# Patient Record
Sex: MALE | Race: WHITE | NOT HISPANIC OR LATINO | Employment: FULL TIME | URBAN - NONMETROPOLITAN AREA
[De-identification: names, ages, dates, MRNs, and addresses within clinical notes are randomized per-mention and may not be internally consistent; named-entity substitution may affect disease eponyms.]

---

## 2019-10-10 ENCOUNTER — OFFICE VISIT (OUTPATIENT)
Dept: UROLOGY | Facility: CLINIC | Age: 24
End: 2019-10-10

## 2019-10-10 VITALS
OXYGEN SATURATION: 99 % | DIASTOLIC BLOOD PRESSURE: 90 MMHG | WEIGHT: 160 LBS | SYSTOLIC BLOOD PRESSURE: 140 MMHG | HEIGHT: 72 IN | RESPIRATION RATE: 18 BRPM | BODY MASS INDEX: 21.67 KG/M2 | HEART RATE: 82 BPM

## 2019-10-10 DIAGNOSIS — N46.9 INFERTILITY MALE: Primary | ICD-10-CM

## 2019-10-10 PROCEDURE — 99204 OFFICE O/P NEW MOD 45 MIN: CPT | Performed by: UROLOGY

## 2019-10-10 NOTE — PROGRESS NOTES
Chief Complaint  Infertility    HPI  Mr. Diogenes Sy is a 24 y.o. gentleman who presents to the clinic today due to infertility.  He and his wife have been attempting conception for 2 years.   He has impregnated another partner in the past. Has a 4 year old.    Sexual method  Neither use birth control and they do not use commercial lubricants.  [place] frequency: approximately 2-3 times per week.    Partner  His partner is 30 years old and she has not previously been pregnant.  She has no chronic medical conditions.  She does not have regular menses.  She has not undergone a fertility work up as far as he knows.  He says she is on several Rx, possibly clomiphene.    Fertility-related history  No other surgeries, h/o radiation or childhood illnesses.       Fertility-related ROS  He does not use marijuana.  He does not have daily fatigue or recently decreased sexual drive/libido.  He has not noticed flushing or hot flashes  He has not noticed breast swelling, tenderness or galactorrhea.  He has not had a history of STDs.  He has not have impairments of visual field, smell or chronic bronchitis.  He has not had have a history of recent acute illnesses.  He does not use a laptop computer placed in his lap regularly    Past Medical History  History reviewed. No pertinent past medical history.    Past Surgical History  History reviewed. No pertinent surgical history.    Medications  No current outpatient medications on file.    Allergies  No Known Allergies    Social History  Social History     Socioeconomic History   • Marital status:      Spouse name: Not on file   • Number of children: Not on file   • Years of education: Not on file   • Highest education level: Not on file   Tobacco Use   • Smoking status: Never Smoker   • Smokeless tobacco: Current User     Types: Snuff   Substance and Sexual Activity   • Alcohol use: Defer   • Drug use: Defer   • Sexual activity: Defer       Family History  He has no  "family history fertility-related issues or cystic fibrosis.    Review of Systems  Constitutional: No fevers or chills  Skin: Negative for rash  Endocrine: No heat/cold intolerance   Cardiovascular: Negative for chest pain or dyspnea on exertion  Respiratory: Negative for shortness of breath or wheezing  Gastrointestinal: No nausea or vomiting  Genitourinary: Negative for current gross hematuria.  Musculoskeletal: No flank pain  Neurological:  Negative for frequent headaches or dizziness  Lymph/Heme: Negative for leg swelling or calf pain.    Physical Exam  Visit Vitals  /90   Pulse 82   Resp 18   Ht 182.9 cm (72\")   Wt 72.6 kg (160 lb)   SpO2 99%   BMI 21.70 kg/m²     Constitutional: NAD, WDWN.   HEENT: NCAT. Conjunctivae normal.  MMM.    Cardiovascular: Regular rate.  Pulmonary/Chest: Respirations are even and non-labored bilaterally.  Abdominal: Soft. No distension, tenderness, masses or guarding. No CVA tenderness.  Neurological: A + O x 3.  Cranial Nerves II-XII grossly intact. Normal gait.  Extremities: FAM x 4, Warm. No clubbing.  No cyanosis.    Skin: Pink, warm and dry.  No rashes noted.  Psychiatric:  Normal mood and affect    Genitourinary  Penis: circumcised penis, glans normal, no penile discharge.  No rashes/lesions.  Meatus in normal position   Testes: Left: 25g, Right: 25g, no masses and normal consistency.  Vasa palpable bilaterally.  No clinically palpable varicocele with valsalva.    Labs  none    Semen Analysis  No actual results    He reports that he had sex the night before providing the semen sample, and reportedly had <10 mil/mL sperm and <1.5 mL semen      Assessment  24 y.o. male with possible secondary infertility.  Risk factors:  Possible fertility issues with wife. I think it is less likely to be male related with a prior child with another partner.     I told him to make sure that he has at least 3 days of abstinence prior to providing his next semen sample and to have his lab work " done in the morning as below.  I also told him to bring his wife to next visit so we can gain more information about her medical history.     Plan  1.  Repeat Semen analysis  2.  Labs:  LH, FSH, prolactin and total/free testosterone (collected between 7-10am)    No Follow-up on file.    Sami Trinidad MD

## 2019-10-11 ENCOUNTER — LAB (OUTPATIENT)
Dept: LAB | Facility: HOSPITAL | Age: 24
End: 2019-10-11

## 2019-10-11 DIAGNOSIS — N46.9 INFERTILITY MALE: ICD-10-CM

## 2019-10-11 DIAGNOSIS — N46.9 MALE INFERTILITY, UNSPECIFIED: Primary | ICD-10-CM

## 2019-10-11 LAB
ESTRADIOL SERPL HS-MCNC: 5.3 PG/ML
FSH SERPL-ACNC: 3.5 MIU/ML
HCT VFR BLD AUTO: 47.9 % (ref 37.5–51)
HGB BLD-MCNC: 16.2 G/DL (ref 13–17.7)
LH SERPL-ACNC: 4.93 MIU/ML
PROLACTIN SERPL-MCNC: 15.8 NG/ML (ref 4.04–15.2)

## 2019-10-11 PROCEDURE — 85014 HEMATOCRIT: CPT | Performed by: UROLOGY

## 2019-10-11 PROCEDURE — 83002 ASSAY OF GONADOTROPIN (LH): CPT

## 2019-10-11 PROCEDURE — 84403 ASSAY OF TOTAL TESTOSTERONE: CPT | Performed by: UROLOGY

## 2019-10-11 PROCEDURE — 84146 ASSAY OF PROLACTIN: CPT | Performed by: UROLOGY

## 2019-10-11 PROCEDURE — 84402 ASSAY OF FREE TESTOSTERONE: CPT | Performed by: UROLOGY

## 2019-10-11 PROCEDURE — 82670 ASSAY OF TOTAL ESTRADIOL: CPT | Performed by: UROLOGY

## 2019-10-11 PROCEDURE — 84270 ASSAY OF SEX HORMONE GLOBUL: CPT | Performed by: UROLOGY

## 2019-10-11 PROCEDURE — 83001 ASSAY OF GONADOTROPIN (FSH): CPT

## 2019-10-11 PROCEDURE — 36415 COLL VENOUS BLD VENIPUNCTURE: CPT | Performed by: UROLOGY

## 2019-10-11 PROCEDURE — 85018 HEMOGLOBIN: CPT | Performed by: UROLOGY

## 2019-10-11 PROCEDURE — 89320 SEMEN ANAL VOL/COUNT/MOT: CPT

## 2019-10-13 LAB
SHBG SERPL-SCNC: 30.7 NMOL/L (ref 16.5–55.9)
TESTOST FREE SERPL-MCNC: 14.3 PG/ML (ref 9.3–26.5)
TESTOST SERPL-MCNC: 483 NG/DL (ref 264–916)

## 2019-10-14 ENCOUNTER — TELEPHONE (OUTPATIENT)
Dept: UROLOGY | Facility: CLINIC | Age: 24
End: 2019-10-14

## 2019-10-15 LAB
CHARACTER SMN: NORMAL
COLOR SMN: NORMAL
LIQUEFACTION TIME SMN: NORMAL MIN
PH SMN: 8 [PH]
SPECIMEN VOL SMN: 2.7 ML (ref 2–5)
SPERM # SMN: 29.4 MILLIONS/ML
SPERM MORPHOLOGY COMMENT: NORMAL
SPERM MOTILE NFR SMN: 65 % MOTILE (ref 50–100)
VISC SMN: NORMAL CP